# Patient Record
Sex: FEMALE | Race: WHITE
[De-identification: names, ages, dates, MRNs, and addresses within clinical notes are randomized per-mention and may not be internally consistent; named-entity substitution may affect disease eponyms.]

---

## 2018-01-31 ENCOUNTER — HOSPITAL ENCOUNTER (OUTPATIENT)
Dept: HOSPITAL 92 - TBSIIMAG | Age: 38
Discharge: HOME | End: 2018-01-31
Attending: NEUROLOGICAL SURGERY
Payer: COMMERCIAL

## 2018-01-31 DIAGNOSIS — M50.123: Primary | ICD-10-CM

## 2018-01-31 DIAGNOSIS — Z98.1: ICD-10-CM

## 2018-01-31 PROCEDURE — 72040 X-RAY EXAM NECK SPINE 2-3 VW: CPT

## 2018-08-13 ENCOUNTER — HOSPITAL ENCOUNTER (OUTPATIENT)
Dept: HOSPITAL 92 - MRI | Age: 38
Discharge: HOME | End: 2018-08-13
Attending: ANESTHESIOLOGY
Payer: COMMERCIAL

## 2018-08-13 DIAGNOSIS — M47.24: Primary | ICD-10-CM

## 2018-08-13 PROCEDURE — 72146 MRI CHEST SPINE W/O DYE: CPT

## 2018-08-13 NOTE — MRI
THORACIC SPINE MRI NONCONTRAST:

8/13/18

 

INDICATION:

Thoracic radiculitis, back pain. 

 

FINDINGS:  

No acute marrow edema. No acute disc space fluid signal. 

 

At the T1-2 level, there is a mild disc bulge effacing ventral thecal sac without significant central
 canal stenosis. At the T5-6 level, there is a small central disc protrusion without significant cord
 mass effect. At T6-7, there is a small central and left paracentral disc protrusion with slight vent
ral cord effacement although no high grade ventral canal stenosis. There is a minimal. Asymmetric to 
the left disc osteophyte of the T10-11 level and T11-12 level without high grade central canal compro
mise. 

 

There is no intrinsic expansile cord lesion evident by noncontrast MR imaging. No obvious intramedull
dontrell signal abnormality of the thoracic spinal cord. 

 

The paraspinal soft tissues reveal no abnormal edema. 

 

IMPRESSION: 

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 

 

POS: [facility]

## 2021-12-02 ENCOUNTER — HOSPITAL ENCOUNTER (OUTPATIENT)
Dept: HOSPITAL 92 - CSHMAMMO | Age: 41
Discharge: HOME | End: 2021-12-02
Attending: OBSTETRICS & GYNECOLOGY
Payer: COMMERCIAL

## 2021-12-02 DIAGNOSIS — N63.10: Primary | ICD-10-CM

## 2021-12-02 PROCEDURE — 77066 DX MAMMO INCL CAD BI: CPT

## 2021-12-02 PROCEDURE — G0279 TOMOSYNTHESIS, MAMMO: HCPCS

## 2022-12-14 ENCOUNTER — HOSPITAL ENCOUNTER (OUTPATIENT)
Dept: HOSPITAL 92 - CSHMAMMO | Age: 42
Discharge: HOME | End: 2022-12-14
Attending: OBSTETRICS & GYNECOLOGY
Payer: COMMERCIAL

## 2022-12-14 DIAGNOSIS — Z12.31: Primary | ICD-10-CM

## 2022-12-14 PROCEDURE — 77063 BREAST TOMOSYNTHESIS BI: CPT

## 2022-12-14 PROCEDURE — 77067 SCR MAMMO BI INCL CAD: CPT

## 2023-04-13 ENCOUNTER — HOSPITAL ENCOUNTER (OUTPATIENT)
Dept: HOSPITAL 92 - CSHSDC | Age: 43
Discharge: HOME | End: 2023-04-13
Attending: INTERNAL MEDICINE
Payer: COMMERCIAL

## 2023-04-13 VITALS — BODY MASS INDEX: 26.6 KG/M2

## 2023-04-13 DIAGNOSIS — Z79.899: ICD-10-CM

## 2023-04-13 DIAGNOSIS — Z98.84: ICD-10-CM

## 2023-04-13 DIAGNOSIS — F41.9: ICD-10-CM

## 2023-04-13 DIAGNOSIS — K64.9: Primary | ICD-10-CM

## 2023-04-13 DIAGNOSIS — K62.5: ICD-10-CM

## 2023-04-13 DIAGNOSIS — K63.5: ICD-10-CM

## 2023-04-13 DIAGNOSIS — K21.9: ICD-10-CM

## 2023-04-13 DIAGNOSIS — E66.9: ICD-10-CM

## 2023-04-13 PROCEDURE — 0DJD8ZZ INSPECTION OF LOWER INTESTINAL TRACT, VIA NATURAL OR ARTIFICIAL OPENING ENDOSCOPIC: ICD-10-PCS | Performed by: INTERNAL MEDICINE

## 2023-12-19 ENCOUNTER — HOSPITAL ENCOUNTER (OUTPATIENT)
Dept: HOSPITAL 92 - CSHMAMMO | Age: 43
Discharge: HOME | End: 2023-12-19
Attending: OBSTETRICS & GYNECOLOGY
Payer: COMMERCIAL

## 2023-12-19 DIAGNOSIS — Z12.31: Primary | ICD-10-CM

## 2023-12-19 PROCEDURE — 77067 SCR MAMMO BI INCL CAD: CPT

## 2023-12-19 PROCEDURE — 77063 BREAST TOMOSYNTHESIS BI: CPT

## 2025-02-13 ENCOUNTER — HOSPITAL ENCOUNTER (OUTPATIENT)
Dept: HOSPITAL 92 - CSHMAMMO | Age: 45
Discharge: HOME | End: 2025-02-13
Attending: OBSTETRICS & GYNECOLOGY
Payer: COMMERCIAL

## 2025-02-13 DIAGNOSIS — Z12.31: Primary | ICD-10-CM

## 2025-02-13 PROCEDURE — 77067 SCR MAMMO BI INCL CAD: CPT

## 2025-02-13 PROCEDURE — 77063 BREAST TOMOSYNTHESIS BI: CPT
